# Patient Record
Sex: MALE | Race: WHITE | NOT HISPANIC OR LATINO | URBAN - METROPOLITAN AREA
[De-identification: names, ages, dates, MRNs, and addresses within clinical notes are randomized per-mention and may not be internally consistent; named-entity substitution may affect disease eponyms.]

---

## 2017-05-13 ENCOUNTER — OUTPATIENT (OUTPATIENT)
Dept: OUTPATIENT SERVICES | Facility: HOSPITAL | Age: 57
LOS: 1 days | Discharge: HOME | End: 2017-05-13

## 2017-06-28 DIAGNOSIS — N40.0 BENIGN PROSTATIC HYPERPLASIA WITHOUT LOWER URINARY TRACT SYMPTOMS: ICD-10-CM

## 2017-06-28 DIAGNOSIS — R74.0 NONSPECIFIC ELEVATION OF LEVELS OF TRANSAMINASE AND LACTIC ACID DEHYDROGENASE [LDH]: ICD-10-CM

## 2017-06-28 DIAGNOSIS — E78.5 HYPERLIPIDEMIA, UNSPECIFIED: ICD-10-CM

## 2017-09-15 ENCOUNTER — OUTPATIENT (OUTPATIENT)
Dept: OUTPATIENT SERVICES | Facility: HOSPITAL | Age: 57
LOS: 1 days | Discharge: HOME | End: 2017-09-15

## 2017-09-15 DIAGNOSIS — R74.0 NONSPECIFIC ELEVATION OF LEVELS OF TRANSAMINASE AND LACTIC ACID DEHYDROGENASE [LDH]: ICD-10-CM

## 2017-09-15 DIAGNOSIS — G47.00 INSOMNIA, UNSPECIFIED: ICD-10-CM

## 2017-09-15 DIAGNOSIS — E78.5 HYPERLIPIDEMIA, UNSPECIFIED: ICD-10-CM

## 2019-01-08 ENCOUNTER — EMERGENCY (EMERGENCY)
Facility: HOSPITAL | Age: 59
LOS: 0 days | Discharge: HOME | End: 2019-01-08
Admitting: PHYSICIAN ASSISTANT

## 2019-01-08 VITALS
SYSTOLIC BLOOD PRESSURE: 161 MMHG | OXYGEN SATURATION: 97 % | RESPIRATION RATE: 17 BRPM | HEART RATE: 76 BPM | TEMPERATURE: 98 F | WEIGHT: 184.97 LBS | DIASTOLIC BLOOD PRESSURE: 98 MMHG

## 2019-01-08 DIAGNOSIS — R04.0 EPISTAXIS: ICD-10-CM

## 2019-01-08 NOTE — ED PROVIDER NOTE - OBJECTIVE STATEMENT
Pt is a 57 y/o Male,no PMHX, presents to ED with nose bleed. Pt states it began when he was in the car on the way home from work. Pt states he blew his nose, and it just began bleeding. He states there was a lot of blood, he got nervous, so he had his friend drop him off at the ER. In ED, no bleeding. Pt is a 59 y/o Male,no PMHX, presents to ED with nose bleed. Pt states it began when he was in the car on the way home from work. Pt states he blew his nose, and it just began bleeding. He states there was a lot of blood, he got nervous, so he had his friend drop him off at the ER. In ED, no bleeding. States initially he did feel bleeding into the back of his throat, but now he does not. Denies trauma, anticoagulation use, bleeding disorders, hx of nose bleeds.

## 2019-01-08 NOTE — ED ADULT NURSE NOTE - OBJECTIVE STATEMENT
Patient states he was driving home and he blew his nose. States his nose was gushing blood. Bleeding is controlled now.

## 2019-01-08 NOTE — ED PROVIDER NOTE - MEDICAL DECISION MAKING DETAILS
Given no bleeding, pt observed in ED and no bleeding. No need for intervention at this time, though did discuss cauterization w/ pt and pt prefers to not have nose touched since bleeding has stopped. Instructed to apply Vaseline/Bacitracin to nose and strict return precautions given. I have discussed the discharge plan with the patient. The patient agrees with the plan, as discussed.  The patient understands Emergency Department diagnosis is a preliminary diagnosis often based on limited information and that the patient must adhere to the follow-up plan as discussed.  The patient understands that if the symptoms worsen or if prescribed medications do not have the desired/planned effect that the patient may return to the Emergency Department at any time for further evaluation and treatment.

## 2019-01-08 NOTE — ED PROVIDER NOTE - NS ED ROS FT
Except as documented in HPI, all other ROS negative.   GENERAL: Denies fever/chills, loss of appetite/weight or fatigue.  SKIN: Denies rashes, abrasions, lacerations, ecchymosis, erythema, or edema.  HEAD: Denies headache, dizziness or trauma.  ENT: + epistaxis.   CARDIAC: Denies chest pain, palpitations, or SOB.   RESPIRATORY: Denies SOB, cough, hemoptysis or wheezing.   NEURO: Denies paresthesias, tingling, weakness, slurred speech or AMS.

## 2019-01-08 NOTE — ED PROVIDER NOTE - PROGRESS NOTE DETAILS
Pt with no active bleeding at this time - will observe for 15 minutes and reassess. Pt still with no bleeding - prefers to just leave alone and be discharged and not have nose touched since bleeding has stopped. Instructed pt to apply Vaseline/Bacitracin to keep nose moist.   Signs and symptoms which should prompt return discussed in detail and pt informed they may return to ED at any time. Pt agreeable with plan and comfortable with discharge.

## 2019-01-08 NOTE — ED PROVIDER NOTE - PHYSICAL EXAMINATION
VITAL SIGNS: I have reviewed the initial vital signs.   CONSTITUTIONAL: Awake, alert. Well-developed; well-nourished; in no distress. Non-toxic appearing.   SKIN: No rash, vesicles/lesion, abrasions or lacerations. No ecchymosis or signs of trauma.   HEAD: Normocephalic; atraumatic.   ENT: Nasal mucosa pink and moist. No septal hematoma. + mild dry blood in left nostril. Airway patent. MMM. Oropharynx clear with no erythema, exudates or tonsillar enlargement. Uvula midline.

## 2019-01-08 NOTE — ED ADULT TRIAGE NOTE - CHIEF COMPLAINT QUOTE
pt states he blew his nose at 18:45 and since then it has not stopped bleeding. pt is not on anticoagulation.

## 2019-01-09 ENCOUNTER — EMERGENCY (EMERGENCY)
Facility: HOSPITAL | Age: 59
LOS: 0 days | Discharge: HOME | End: 2019-01-09
Attending: STUDENT IN AN ORGANIZED HEALTH CARE EDUCATION/TRAINING PROGRAM | Admitting: EMERGENCY MEDICINE

## 2019-01-09 VITALS
DIASTOLIC BLOOD PRESSURE: 80 MMHG | SYSTOLIC BLOOD PRESSURE: 131 MMHG | HEART RATE: 78 BPM | TEMPERATURE: 96 F | RESPIRATION RATE: 16 BRPM | OXYGEN SATURATION: 98 %

## 2019-01-09 DIAGNOSIS — R42 DIZZINESS AND GIDDINESS: ICD-10-CM

## 2019-01-09 DIAGNOSIS — R04.0 EPISTAXIS: ICD-10-CM

## 2019-01-09 RX ORDER — AMOXICILLIN 250 MG/5ML
1 SUSPENSION, RECONSTITUTED, ORAL (ML) ORAL
Qty: 10 | Refills: 0 | OUTPATIENT
Start: 2019-01-09 | End: 2019-01-13

## 2019-01-09 NOTE — ED PROVIDER NOTE - ATTENDING CONTRIBUTION TO CARE
Seen with PA seen by ENT, bleeding controlled discharged linda to fallow up with ENT Seen by ENT and they will fallow patient in office

## 2019-01-09 NOTE — ED PROVIDER NOTE - NSFOLLOWUPINSTRUCTIONS_ED_ALL_ED_FT
Follow up with ENT Friday.  Take antibiotics as prescribed. Afrin nasal spray as directed.     Epistaxis (nosebleed)    Nosebleeds are common and can be caused by many conditions, such as injury, infections, dry mucous membranes or dry climate, medicines, nose picking, and home heating and cooling systems. Try controlling your nosebleed by pinching your nose continuously for at least 10 minutes. Avoid lying down while you are having a nosebleed. Sit up and lean forward. Avoid blowing or sniffing your nose for a number of hours after having a nosebleed. Resume your normal activities as you are able, but avoid straining, lifting, or bending at the waist for several days. Maintain humidity in your home by using less air conditioning or by using a humidifier.     If your nose was packed by your health care provider, try to maintain the pack inside of your nose until your health care provider removes it. If a balloon catheter was used to pack your nose, do not cut or remove it unless your health care provider has instructed you to do that.     Aspirin and blood thinners make bleeding more likely. If you are prescribed these medicines and you suffer from nosebleeds, ask your health care provider if you should stop taking the medicines or adjust the dose. Do not stop medicines unless directed by your health care provider     SEEK IMMEDIATE MEDICAL CARE IF YOU HAVE THE FOLLOWING SYMPTOMS: nosebleed lasting longer than 20 minutes, unusual bleeding from or bruising on other parts of your body, dizziness or lightheadedness, nosebleed occurring after a head injury, or fever.    Patient to be discharged from ED. Any available test results were discussed with patient and/or family. Verbal instructions given, including instructions to return to ED immediately for any new, worsening, or concerning symptoms. Patient endorsed understanding. Written discharge instructions additionally given, including follow-up plan.

## 2019-01-09 NOTE — ED PROVIDER NOTE - NS ED ROS FT
Review of Systems    Constitutional: (-) fever, (-) chills  Eyes/ENT: (-) blurry vision, (+) epistaxis, (-) sorethroat  Cardiovascular: (-) chest pain, (-) syncope  Respiratory: (-) cough, (-) shortness of breath  Gastrointestinal: (-) pain, (-) nausea, (-) vomiting, (-) diarrhea  Musculoskeletal: (-) neck pain, (-) back pain, (-) joint pain  Integumentary: (-) rash, (-) edema  Neurological: (-) headache, (-) altered mental status, (+) light headed

## 2019-01-09 NOTE — ED ADULT TRIAGE NOTE - CHIEF COMPLAINT QUOTE
"I was here yesterday for the same thing. My nose will bleed a lot but then it stops. I'm swallowing a lot of blood too." Pt denies use of antiplatelet/ anticoagulation medication.

## 2019-01-09 NOTE — ED PROVIDER NOTE - CARE PROVIDER_API CALL
Kenzie Goddard), Otolaryngology  [address] [place] [address]  Phone: [phone]  Fax: [phone]

## 2019-01-09 NOTE — CONSULT NOTE ADULT - ASSESSMENT
59 y/o male with L epistaxis - well controlled with 5.5 rhino rocket.  - Rhino rocket must remain in place 48-72 hrs  - f/u with Dr. Goddard in office on Fri to have packing removed.   - prophylactic abx while packing is in place  - Afrin to b/l nares 2 sprays q12 x3 days  - Vaseline to b/l nares 3x daily to keep nasal mucosa moist  - avoid nose blowing, heavy lifting or strenuous activity. Sneeze with mouth open.  - Pt understands and agrees with plan.

## 2019-01-09 NOTE — CONSULT NOTE ADULT - SUBJECTIVE AND OBJECTIVE BOX
ENT DAILY PROGRESS NOTE    Overnight events/Interval HPI: HPI:  57 y/o male with epistaxis x2 days. Reports he was blowing his nose yesterday which precipitated the event. Unable to be controlled with direct pressure. Pt came into ED when it eventually stopped bleeding spontaneously. He was sent home. Later that night it restarted and it became severe to the point pt was vomiting blood and passing loose dark stools. Today in ED he had a 5.5 rhino rocket placed. Bleeding was well controlled and pt is doing well.  Not currently on any medication. No fevers, chills.          Allergies    No Known Allergies    Intolerances        MEDICATIONS:  Antiinfectives:     IV fluids:    Hematologic/Anticoagulation:    Pain medications/Neuro:    Endocrine Medications:     All other standing medications:     All other PRN medications:      Vital Signs Last 24 Hrs  T(C): 35.6 (09 Jan 2019 11:58), Max: 36.7 (08 Jan 2019 19:18)  T(F): 96.1 (09 Jan 2019 11:58), Max: 98 (08 Jan 2019 19:18)  HR: 78 (09 Jan 2019 11:58) (76 - 78)  BP: 131/80 (09 Jan 2019 11:58) (131/80 - 161/98)  BP(mean): --  RR: 16 (09 Jan 2019 11:58) (16 - 17)  SpO2: 98% (09 Jan 2019 11:58) (97% - 98%)          PHYSICAL EXAM:    ENT EXAM-   Constitutional: Well-developed, well-nourished.  No hoarseness.   NAD, awake/ alert  Head:  normocephalic, atraumatic.   Nose:  + packing in L nare. No active bleed. + dry blood to L nare, R nare clear, removed small clot with coton tip applicator but no active bleed.   OC/OP:  Floor of mouth, buccal mucosa, lips, hard palate, soft palate, uvula normal.  Mucosa moist. +Streak of blood in post oropharynx, no clots, no active bleed.  Neck:  Trachea midline.  Thyroid, parotid and submandibular glands normal.  Lymph:  No cervical adenopathy.          LABS:  CBC-    BMP/CMP-        Coagulation Studies-    Endocrine Panel-              RADIOLOGY & ADDITIONAL STUDIES:

## 2019-01-09 NOTE — ED PROVIDER NOTE - OBJECTIVE STATEMENT
59 yo M hx of high chol here for nosebleed. Patient with bleeding from left nare yesterday. He came to ED and was d/c home. Nose bleed started again last night and this morning. + feeling lightheaded. No CP or syncope.

## 2019-01-09 NOTE — ED PROVIDER NOTE - PHYSICAL EXAMINATION
Gen: Alert, NAD, well appearing  Head: NC, AT, PERRL, EOMI, normal lids/conjunctiva  ENT: normal hearing, patent oropharynx. +dried blood b/l nares. +ooze from left nare. No posterior bleed.   Neck: +supple, no tenderness/meningismus,  Pulm: Bilateral BS, normal resp effort, no wheeze/stridor/retractions  CV: RRR, no murmer  Skin: no rash, warm/dry  Neuro: AAOx3, no sensory/motor deficits

## 2019-01-09 NOTE — ED PROVIDER NOTE - PROGRESS NOTE DETAILS
Patient with ooze now from right nare. Patient advised that he will need rapid rhino changed from 5.c to 7.5. Patient refusing at this time. ENT PA bedside, states Dr. Mancera sent patient to see patient Patient seen by ENT DIAMOND Lerma, leave 5.5 packing in place, bleeding controlled. D/C  home with abx, Afrin nasal spray, f/u with Dr. Goddard Friday.

## 2019-01-11 ENCOUNTER — APPOINTMENT (OUTPATIENT)
Dept: OTOLARYNGOLOGY | Facility: CLINIC | Age: 59
End: 2019-01-11
Payer: COMMERCIAL

## 2019-01-11 VITALS
DIASTOLIC BLOOD PRESSURE: 93 MMHG | SYSTOLIC BLOOD PRESSURE: 147 MMHG | HEIGHT: 70 IN | WEIGHT: 185 LBS | BODY MASS INDEX: 26.48 KG/M2

## 2019-01-11 DIAGNOSIS — R04.0 EPISTAXIS: ICD-10-CM

## 2019-01-11 DIAGNOSIS — E78.00 PURE HYPERCHOLESTEROLEMIA, UNSPECIFIED: ICD-10-CM

## 2019-01-11 DIAGNOSIS — Z78.9 OTHER SPECIFIED HEALTH STATUS: ICD-10-CM

## 2019-01-11 DIAGNOSIS — Z87.09 PERSONAL HISTORY OF OTHER DISEASES OF THE RESPIRATORY SYSTEM: ICD-10-CM

## 2019-01-11 PROBLEM — Z00.00 ENCOUNTER FOR PREVENTIVE HEALTH EXAMINATION: Status: ACTIVE | Noted: 2019-01-11

## 2019-01-11 PROCEDURE — 31231 NASAL ENDOSCOPY DX: CPT

## 2019-01-11 PROCEDURE — 99204 OFFICE O/P NEW MOD 45 MIN: CPT | Mod: 25

## 2019-01-11 RX ORDER — ESCITALOPRAM OXALATE 10 MG/1
10 TABLET, FILM COATED ORAL
Refills: 0 | Status: ACTIVE | COMMUNITY

## 2019-01-11 RX ORDER — ZOLPIDEM TARTRATE 5 MG/1
TABLET, FILM COATED ORAL
Refills: 0 | Status: ACTIVE | COMMUNITY

## 2019-01-11 RX ORDER — ROSUVASTATIN CALCIUM 5 MG/1
TABLET, FILM COATED ORAL
Refills: 0 | Status: ACTIVE | COMMUNITY

## 2019-01-11 NOTE — HISTORY OF PRESENT ILLNESS
[de-identified] :  58 Year old patient is present today for epistaxis. 3 days ago, patient developed nose bleed while driving. He was getting over bronchitis, was taking abx and mucinex and prednisone, completed these and was feeling better until 2 days ago. He went to ED, where it stopped spontaneously, was discharged. Several hours later began to bleed again, stopped. Next morning, started bleeding again, went to ED Wednesday am, where balloon was placed. Patient currently has balloon placed 2 days ago in the ED. No bleeding with balloon in. Bleeding all from left side. Never happened before. No issues with blood pressure. No nasal issues prior to bleeding, no nasal obstruction or bleeding.

## 2019-01-11 NOTE — PHYSICAL EXAM
[Midline] : trachea located in midline position [Normal] : no rashes [de-identified] : left nasal pack in place, deflated and removed [de-identified] : no bleeding in oropharynx

## 2019-01-11 NOTE — ASSESSMENT
[FreeTextEntry1] : - discussed nose bleed precautions - how to manage them when they occur and how to prevent them. Use petroleum jelly or Aquaphor twice a day for 1 week, then use nasal saline spray several times a day to minimize risk of nasal dryness.\par - f/up as needed\par

## 2019-11-12 ENCOUNTER — OUTPATIENT (OUTPATIENT)
Dept: OUTPATIENT SERVICES | Facility: HOSPITAL | Age: 59
LOS: 1 days | Discharge: HOME | End: 2019-11-12

## 2019-11-12 DIAGNOSIS — F41.9 ANXIETY DISORDER, UNSPECIFIED: ICD-10-CM

## 2019-11-12 DIAGNOSIS — G47.00 INSOMNIA, UNSPECIFIED: ICD-10-CM

## 2019-11-12 DIAGNOSIS — E78.5 HYPERLIPIDEMIA, UNSPECIFIED: ICD-10-CM

## 2022-10-03 NOTE — CONSULT LETTER
[Dear  ___] : Dear  [unfilled], [Consult Letter:] : I had the pleasure of evaluating your patient, [unfilled]. [Please see my note below.] : Please see my note below. [Consult Closing:] : Thank you very much for allowing me to participate in the care of this patient.  If you have any questions, please do not hesitate to contact me. [Sincerely,] : Sincerely, [FreeTextEntry2] : Dr Lalit Flores [FreeTextEntry3] : Kenzie Goddard MD\par Otolaryngology - Head & Neck Surgery\par  Itraconazole Counseling:  I discussed with the patient the risks of itraconazole including but not limited to liver damage, nausea/vomiting, neuropathy, and severe allergy.  The patient understands that this medication is best absorbed when taken with acidic beverages such as non-diet cola or ginger ale.  The patient understands that monitoring is required including baseline LFTs and repeat LFTs at intervals.  The patient understands that they are to contact us or the primary physician if concerning signs are noted.
